# Patient Record
Sex: FEMALE | Race: WHITE | URBAN - METROPOLITAN AREA
[De-identification: names, ages, dates, MRNs, and addresses within clinical notes are randomized per-mention and may not be internally consistent; named-entity substitution may affect disease eponyms.]

---

## 2017-08-03 ENCOUNTER — OFFICE VISIT (OUTPATIENT)
Dept: FAMILY MEDICINE | Facility: CLINIC | Age: 37
End: 2017-08-03
Payer: COMMERCIAL

## 2017-08-03 ENCOUNTER — RADIANT APPOINTMENT (OUTPATIENT)
Dept: GENERAL RADIOLOGY | Facility: CLINIC | Age: 37
End: 2017-08-03
Attending: PHYSICIAN ASSISTANT
Payer: COMMERCIAL

## 2017-08-03 VITALS
BODY MASS INDEX: 24.39 KG/M2 | HEIGHT: 65 IN | DIASTOLIC BLOOD PRESSURE: 68 MMHG | WEIGHT: 146.38 LBS | TEMPERATURE: 98.3 F | HEART RATE: 84 BPM | SYSTOLIC BLOOD PRESSURE: 106 MMHG

## 2017-08-03 DIAGNOSIS — F17.209 TOBACCO USE DISORDER, CONTINUOUS: ICD-10-CM

## 2017-08-03 DIAGNOSIS — S80.02XA CONTUSION OF LEFT KNEE, INITIAL ENCOUNTER: Primary | ICD-10-CM

## 2017-08-03 PROCEDURE — 99213 OFFICE O/P EST LOW 20 MIN: CPT | Performed by: PHYSICIAN ASSISTANT

## 2017-08-03 PROCEDURE — 73560 X-RAY EXAM OF KNEE 1 OR 2: CPT | Mod: LT

## 2017-08-03 RX ORDER — CYCLOBENZAPRINE HCL 5 MG
5 TABLET ORAL 3 TIMES DAILY PRN
Qty: 16 TABLET | Refills: 0 | Status: SHIPPED | OUTPATIENT
Start: 2017-08-03

## 2017-08-03 ASSESSMENT — ENCOUNTER SYMPTOMS: FEVER: 0

## 2017-08-03 ASSESSMENT — PAIN SCALES - GENERAL: PAINLEVEL: EXTREME PAIN (9)

## 2017-08-03 NOTE — NURSING NOTE
"Chief Complaint   Patient presents with     Knee Pain       Initial /68 (BP Location: Right arm, Patient Position: Sitting, Cuff Size: Adult Regular)  Pulse 84  Temp 98.3  F (36.8  C) (Oral)  Ht 5' 5\" (1.651 m)  Wt 146 lb 6 oz (66.4 kg)  BMI 24.36 kg/m2 Estimated body mass index is 24.36 kg/(m^2) as calculated from the following:    Height as of this encounter: 5' 5\" (1.651 m).    Weight as of this encounter: 146 lb 6 oz (66.4 kg).  Medication Reconciliation: complete     Erika ETIENNE, Certified Medical Assistant (AAMA)August 3, 2017 8:03 AM      "

## 2017-08-03 NOTE — LETTER
[facility]  [address] [facility] [address]  Phone: [phone]    August 3, 2017        Jenniffer Chahal  [address]  [facility] [address]          To whom it may concern:    RE: Jenniffer Chahal    Patient was seen and treated today at our clinic (from 7:45am - 9:30am) and missed work. She may return to work today.    Please contact me for questions or concerns.      Sincerely,        Fara Milan PA-C

## 2017-08-03 NOTE — PATIENT INSTRUCTIONS
[facility]   Discharged by : Erika ETIENNE Certified Medical Assistant (AAMA)August 3, 2017 8:55 AM    Paper scripts provided to patient : none   If you have any questions regarding to your visit please contact your care team:   [facility] Clinic Hours Telephone Number   Dr. Erika Dominguez, ANABEL   7am-7pm Monday - Thursday   7am-5pm Fridays  [phone]   (Appointment scheduling available 24/7)   RN Line   [phone] option 2     What options do I have for visits at the clinic other than the traditional office visit?   To expand how we care for you, many of our providers are utilizing electronic visits (e-visits) and telephone visits, when medically appropriate, for interactions with their patients rather than a visit in the clinic. We also offer nurse visits for many medical concerns. Just like any other service, we will bill your insurance company for this type of visit based on time spent on the phone with your provider. Not all insurance companies cover these visits. Please check with your medical insurance if this type of visit is covered. You will be responsible for any charges that are not paid by your insurance.   E-visits via Speedmenthart: generally incur a $35.00 fee.   Telephone visits:   Time spent on the phone: *charged based on time that is spent on the phone in increments of 10 minutes. Estimated cost:   5-10 mins $30.00   11-20 mins. $59.00   21-30 mins. $85.00   Use Speedmenthart (secure email communication and access to your chart) to send your primary care provider a message or make an appointment. Ask someone on your Team how to sign up for [employer].   For a Price Quote for your services, please call our Consumer Price Line at [phone].   As always, Thank you for trusting us with your health care needs!                    [facility]:    Scheduling Appointments  [facility]  Call:  [phone]    [facility]  Call: [phone]    [facility]  Call: [phone]    WHERE TO GO FOR CARE?    Clinic    Make an appointment if you:       Are sick (cold, cough, flu, sore throat, earache or in pain).       Have a small injury (sprain, small cut, burn or broken bone).       Need a physical exam, Pap smear, vaccine or prescription refill.       Have questions about your health or medicines.    To reach us:      Call [phone] ([phone]). Open 24 hours every day. (For counseling services, call [phone].)    Log into ZANK.mobi at [employer]. (Visit [employer]Friendemic to create an account.) Hospital emergency room    An emergency is a serious or life- threatening problem that must be treated right away.    Call [phone] or get to the hospital if you have:      Very bad or sudden:            - Chest pain or pressure         - Bleeding         - Head or belly pain         - Dizziness or trouble seeing, walking or                          Speaking      Problems breathing      Blood in your vomit or you are coughing up blood      A major injury (knocked out, loss of a finger or limb, rape, broken bone protruding from skin)    A mental health crisis. (Or call the Mental Health Crisis line at 1-488.452.5751 or Suicide Prevention Hotline at 1-765.742.7132.)    Open 24 hours every day. You don't need an appointment.     Urgent care    Visit urgent care for sickness or small injuries when the clinic is closed. You don't need an appointment. To check hours or find an urgent care near you, visit [email or web address]. Online care    Get online care from [email or web address] for more than 70 common problems, like colds, allergies and infections. Open 24 hours every day at: [email or web address]   Need help deciding?    For advice about where to be seen, you may call your clinic and ask to speak with a nurse. We're here for you 24 hours every day.          If you are deaf or hard of hearing, please let us know. We provide many free services including sign language interpreters, oral interpreters, TTYs, telephone amplifiers, note takers and written materials.

## 2017-08-03 NOTE — MR AVS SNAPSHOT
After Visit Summary   8/3/2017    Jenniffer Chahal    MRN: [id number]           Patient Information     Date Of Birth          1980        Visit Information        Provider Department      8/3/2017 7:20 AM Fara Milan PA-C [facility]        Today's Diagnoses     Contusion of left knee, initial encounter    -  1    Tobacco use disorder, continuous          Care Instructions    [facility]   Discharged by : Erika ETIENNE, Certified Medical Assistant (AAMA)August 3, 2017 8:55 AM    Paper scripts provided to patient : none   If you have any questions regarding to your visit please contact your care team:   [facility] Clinic Hours Telephone Number   Dr. Erika Dominguez PA-C   7am-7pm Monday - Thursday   7am-5pm Fridays  [phone]   (Appointment scheduling available 24/7)   RN Line   [phone] option 2     What options do I have for visits at the clinic other than the traditional office visit?   To expand how we care for you, many of our providers are utilizing electronic visits (e-visits) and telephone visits, when medically appropriate, for interactions with their patients rather than a visit in the clinic. We also offer nurse visits for many medical concerns. Just like any other service, we will bill your insurance company for this type of visit based on time spent on the phone with your provider. Not all insurance companies cover these visits. Please check with your medical insurance if this type of visit is covered. You will be responsible for any charges that are not paid by your insurance.   E-visits via [employer]: generally incur a $35.00 fee.   Telephone visits:   Time spent on the phone: *charged based on time that is spent on the phone in increments of 10 minutes. Estimated cost:   5-10 mins $30.00   11-20 mins. $59.00   21-30 mins. $85.00   Use [employer] (secure email communication and access to your  chart) to send your primary care provider a message or make an appointment. Ask someone on your Team how to sign up for [employer].   For a Price Quote for your services, please call our Consumer Price Line at [phone].   As always, Thank you for trusting us with your health care needs!                    [facility]:    Scheduling Appointments  [facility]  Call: [phone]    [facility]  Call: [phone]    [facility]  Call: [phone]    WHERE TO GO FOR CARE?    Clinic    Make an appointment if you:       Are sick (cold, cough, flu, sore throat, earache or in pain).       Have a small injury (sprain, small cut, burn or broken bone).       Need a physical exam, Pap smear, vaccine or prescription refill.       Have questions about your health or medicines.    To reach us:      Call [phone] ([phone]). Open 24 hours every day. (For counseling services, call [phone].)    Log into MENA360 at [email or web address]. (Visit [email or web address] to create an account.) Hospital emergency room    An emergency is a serious or life- threatening problem that must be treated right away.    Call [phone] or get to the hospital if you have:      Very bad or sudden:            - Chest pain or pressure         - Bleeding         - Head or belly pain         - Dizziness or trouble seeing, walking or                          Speaking      Problems breathing      Blood in your vomit or you are coughing up blood      A major injury (knocked out, loss of a finger or limb, rape, broken bone protruding from skin)    A mental health crisis. (Or call the Mental Health Crisis line at 1-406.513.8834 or Suicide Prevention Hotline at 1-191.368.1394.)    Open 24 hours every day. You don't need an appointment.     Urgent care    Visit urgent care for sickness or small injuries when the clinic is closed. You don't need an appointment.  "To check hours or find an urgent care near you, visit [email or web address]. Online care    Get online care from OnCare.org for more than 70 common problems, like colds, allergies and infections. Open 24 hours every day at: [email or web address]   Need help deciding?    For advice about where to be seen, you may call your clinic and ask to speak with a nurse. We're here for you 24 hours every day.         If you are deaf or hard of hearing, please let us know. We provide many free services including sign language interpreters, oral interpreters, TTYs, telephone amplifiers, note takers and written materials.                 Follow-ups after your visit        Who to contact     If you have questions or need follow up information about today's clinic visit or your schedule please contact [facility] directly at [phone].  Normal or non-critical lab and imaging results will be communicated to you by MyChart, letter or phone within 4 business days after the clinic has received the results. If you do not hear from us within 7 days, please contact the clinic through SafeRenthart or phone. If you have a critical or abnormal lab result, we will notify you by phone as soon as possible.  Submit refill requests through Ismole or call your pharmacy and they will forward the refill request to us. Please allow 3 business days for your refill to be completed.          Additional Information About Your Visit        MyChart Information     Ismole lets you send messages to your doctor, view your test results, renew your prescriptions, schedule appointments and more. To sign up, go to [email or web address] . Click on \"Log in\" on the left side of the screen, which will take you to the Welcome page. Then click on \"Sign up Now\" on the right side of the page.     You will be asked to enter the access code listed below, as well as some personal information. Please follow the directions to create your username and " "password.     Your access code is: [email or web address]  Expires: 2017  8:50 AM     Your access code will  in 90 days. If you need help or a new code, please call your [place] clinic or [phone].        Care EveryWhere ID     This is your Care EveryWhere ID. This could be used by other organizations to access your [place] medical records  [id number]        Your Vitals Were     Pulse Temperature Height BMI (Body Mass Index)          84 98.3  F (36.8  C) (Oral) 5' 5\" (1.651 m) 24.36 kg/m2         Blood Pressure from Last 3 Encounters:   17 106/68   13 124/78   09 135/89    Weight from Last 3 Encounters:   17 146 lb 6 oz (66.4 kg)   09 162 lb 12.8 oz (73.8 kg)   09/10/09 158 lb (71.7 kg)              We Performed the Following     CRUTCHES, UNDERARM WOOD     XR Knee Standing Left 2 Views          Today's Medication Changes          These changes are accurate as of: 8/3/17  8:55 AM.  If you have any questions, ask your nurse or doctor.               Start taking these medicines.        Dose/Directions    cyclobenzaprine 5 MG tablet   Commonly known as:  FLEXERIL   Used for:  Contusion of left knee, initial encounter   Started by:  Fara Milan PA-C        Dose:  5 mg   Take 1 tablet (5 mg) by mouth 3 times daily as needed for muscle spasms   Quantity:  16 tablet   Refills:  0         Stop taking these medicines if you haven't already. Please contact your care team if you have questions.     ibuprofen 600 MG tablet   Commonly known as:  ADVIL/MOTRIN   Stopped by:  Fara Milan PA-C           ibuprofen 800 MG tablet   Commonly known as:  ADVIL/MOTRIN   Stopped by:  Fara Milan PA-C                Where to get your medicines      These medications were sent to [facility] [address] [place], [place] [address] [facility] [address] [facility]  [address] [facility] [address]     Phone:  [phone]     cyclobenzaprine 5 MG tablet                Primary Care Provider " Office Phone # Fax #    Venessa Triana [phone]       [address] [address]        Equal Access to Services     DM ROSADO : Gaurav ta idanao Soomaali, waaxda luqadaha, qaybta kaalmada adeegyada, kenneth worthington laCarlosloree balderas. So [facility] [phone].    ATENCIÓN: Si habla español, tiene a her disposición servicios gratuitos de asistencia lingüística. Llame al [phone].    We comply with applicable federal civil rights laws and [place] laws. We do not discriminate on the basis of race, color, national origin, age, disability sex, sexual orientation or gender identity.            Thank you!     Thank you for choosing [facility]  for your care. Our goal is always to provide you with excellent care. Hearing back from our patients is one way we can continue to improve our services. Please take a few minutes to complete the written survey that you may receive in the mail after your visit with us. Thank you!             Your Updated Medication List - Protect others around you: Learn how to safely use, store and throw away your medicines at www.disposemymeds.org.          This list is accurate as of: 8/3/17  8:55 AM.  Always use your most recent med list.                   Brand Name Dispense Instructions for use Diagnosis    cyclobenzaprine 5 MG tablet    FLEXERIL    16 tablet    Take 1 tablet (5 mg) by mouth 3 times daily as needed for muscle spasms    Contusion of left knee, initial encounter       oxyCODONE-acetaminophen 5-325 MG per tablet    PERCOCET    15 tablet    Take 1-2 tablets by mouth every 4 hours as needed for pain.    Rib pain       traMADol 50 MG tablet    ULTRAM    60    ONE TO TWO TABLETS  EVERY SIX HOURS AS NEEDED FOR PAIN    Arthralgia of knee

## 2017-08-03 NOTE — PROGRESS NOTES
HPI    SUBJECTIVE:                                                    Jenniffer Chahal is a 37 year old female who presents to clinic today for the following health issues:    Joint Pain    Onset: 1 week ago    Description:   Location: left knee  Character: Dull ache, sharp when flexes knee    Intensity: 9/10    Progression of Symptoms: same    Accompanying Signs & Symptoms:  Other symptoms: radiation of pain to up into thigh, swelling & bruising    History:   Previous similar pain: no    Precipitating factors:   Trauma or overuse: YES- tripped in a pot hole and landed on her L knee    Alleviating factors:  Improved by: nothing    Therapies Tried and outcome: Ice, heat, epsom salt bath, elevating, ibuprofen, Tylenol- nothing helps    Has also been wearing a knee brace. Denies warmth or erythema.    Additional complaints: None    Chart Review:  No flowsheet data found.  No flowsheet data found.    Patient Active Problem List   Diagnosis     Tobacco use disorder, continuous     CARDIOVASCULAR SCREENING; LDL GOAL LESS THAN 160     Past Surgical History:   Procedure Laterality Date     APPENDECTOMY       TONSILLECTOMY       Family History   Problem Relation Age of Onset     Hypertension Mother      Arthritis Mother      CANCER Maternal Grandfather      Asthma Brother      Asthma Son      Unknown/Adopted Father       Social History   Substance Use Topics     Smoking status: Current Every Day Smoker     Packs/day: 0.50     Years: 16.00     Types: Cigarettes     Smokeless tobacco: Never Used     Alcohol use Yes      Comment: 3 X YR        Problem list, Medication list, Allergies, Medical/Social/Surg hx reviewed in [employer], updated as appropriate.      Review of Systems   Constitutional: Negative for fever.   Cardiovascular: Negative for leg swelling.   Musculoskeletal: Positive for joint pain.   Skin:        + bruising. No warmth or erythema   Neurological:        No numbness   All other systems reviewed and are  "negative.        Physical Exam   Cardiovascular:   Pulses:       Posterior tibial pulses are 2+ on the left side.   Musculoskeletal:        Left knee: She exhibits swelling and ecchymosis. She exhibits normal range of motion, no deformity, no erythema, normal alignment and normal patellar mobility. Tenderness found. Patellar tendon tenderness noted.        Legs:  Skin: Ecchymosis noted. No erythema.     Vital Signs  /68 (BP Location: Right arm, Patient Position: Sitting, Cuff Size: Adult Regular)  Pulse 84  Temp 98.3  F (36.8  C) (Oral)  Ht 5' 5\" (1.651 m)  Wt 146 lb 6 oz (66.4 kg)  BMI 24.36 kg/m2   Body mass index is 24.36 kg/(m^2).    Diagnostic Test Results:  Xray L knee - negative by my read    ASSESSMENT/PLAN:                                                    Tobacco Cessation:   reports that she has been smoking Cigarettes.  She has a 8.00 pack-year smoking history. She has never used smokeless tobacco.  Tobacco Cessation Action Plan: Information offered: Patient not interested at this time      ICD-10-CM    1. Contusion of left knee, initial encounter S80.02XA CRUTCHES, UNDERARM WOOD     cyclobenzaprine (FLEXERIL) 5 MG tablet   2. Tobacco use disorder, continuous F17.209      NVI, no warmth or erythema. No signs of internal derangement. Xray negative. Likely knee contusion, continue with RICE. Recommended knee immobilizer and crutches, pt declines knee immobilizer. Rx Flexeril for pain, continue ibuprofen as well.    I have discussed any lab or imaging results, the patient's diagnosis, and my plan of treatment with the patient and/or family. Patient is aware to come back in if with worsening symptoms or if no relief despite treatment plan.  Patient voiced understanding and had no further questions.       Follow Up: Data Unavailable    COURTNEY Elise, PAAnitaC  [facility]              "

## 2017-10-01 ENCOUNTER — HEALTH MAINTENANCE LETTER (OUTPATIENT)
Age: 37
End: 2017-10-01

## 2018-09-11 ENCOUNTER — TELEPHONE (OUTPATIENT)
Dept: FAMILY MEDICINE | Facility: CLINIC | Age: 38
End: 2018-09-11